# Patient Record
Sex: MALE | Race: WHITE | NOT HISPANIC OR LATINO | URBAN - METROPOLITAN AREA
[De-identification: names, ages, dates, MRNs, and addresses within clinical notes are randomized per-mention and may not be internally consistent; named-entity substitution may affect disease eponyms.]

---

## 2017-01-03 ENCOUNTER — COMMUNICATION - HEALTHEAST (OUTPATIENT)
Dept: FAMILY MEDICINE | Facility: CLINIC | Age: 37
End: 2017-01-03

## 2017-01-03 DIAGNOSIS — F90.0 ADHD, PREDOMINANTLY INATTENTIVE TYPE: ICD-10-CM

## 2017-01-03 DIAGNOSIS — G89.4 CHRONIC PAIN SYNDROME: ICD-10-CM

## 2017-01-04 ENCOUNTER — COMMUNICATION - HEALTHEAST (OUTPATIENT)
Dept: FAMILY MEDICINE | Facility: CLINIC | Age: 37
End: 2017-01-04

## 2017-01-12 ENCOUNTER — OFFICE VISIT - HEALTHEAST (OUTPATIENT)
Dept: FAMILY MEDICINE | Facility: CLINIC | Age: 37
End: 2017-01-12

## 2017-01-12 DIAGNOSIS — F45.8 BRUXISM: ICD-10-CM

## 2017-01-12 DIAGNOSIS — Z30.09 ENCOUNTER FOR VASECTOMY COUNSELING: ICD-10-CM

## 2017-01-12 DIAGNOSIS — K14.0 GLOSSITIS: ICD-10-CM

## 2017-01-31 ENCOUNTER — COMMUNICATION - HEALTHEAST (OUTPATIENT)
Dept: FAMILY MEDICINE | Facility: CLINIC | Age: 37
End: 2017-01-31

## 2017-01-31 DIAGNOSIS — G89.4 CHRONIC PAIN SYNDROME: ICD-10-CM

## 2017-01-31 DIAGNOSIS — F90.0 ADHD, PREDOMINANTLY INATTENTIVE TYPE: ICD-10-CM

## 2017-01-31 DIAGNOSIS — F41.9 ANXIETY: ICD-10-CM

## 2017-02-01 ENCOUNTER — COMMUNICATION - HEALTHEAST (OUTPATIENT)
Dept: FAMILY MEDICINE | Facility: CLINIC | Age: 37
End: 2017-02-01

## 2017-02-15 ENCOUNTER — COMMUNICATION - HEALTHEAST (OUTPATIENT)
Dept: FAMILY MEDICINE | Facility: CLINIC | Age: 37
End: 2017-02-15

## 2017-03-02 ENCOUNTER — COMMUNICATION - HEALTHEAST (OUTPATIENT)
Dept: FAMILY MEDICINE | Facility: CLINIC | Age: 37
End: 2017-03-02

## 2017-03-02 DIAGNOSIS — F90.0 ADHD, PREDOMINANTLY INATTENTIVE TYPE: ICD-10-CM

## 2017-03-02 DIAGNOSIS — G89.4 CHRONIC PAIN SYNDROME: ICD-10-CM

## 2017-04-04 ENCOUNTER — COMMUNICATION - HEALTHEAST (OUTPATIENT)
Dept: FAMILY MEDICINE | Facility: CLINIC | Age: 37
End: 2017-04-04

## 2017-04-04 DIAGNOSIS — F90.0 ADHD, PREDOMINANTLY INATTENTIVE TYPE: ICD-10-CM

## 2017-04-04 DIAGNOSIS — G89.4 CHRONIC PAIN SYNDROME: ICD-10-CM

## 2017-05-01 ENCOUNTER — COMMUNICATION - HEALTHEAST (OUTPATIENT)
Dept: FAMILY MEDICINE | Facility: CLINIC | Age: 37
End: 2017-05-01

## 2017-05-01 DIAGNOSIS — F90.0 ADHD, PREDOMINANTLY INATTENTIVE TYPE: ICD-10-CM

## 2017-05-01 DIAGNOSIS — F41.9 ANXIETY: ICD-10-CM

## 2017-05-01 DIAGNOSIS — F33.1 MODERATE EPISODE OF RECURRENT MAJOR DEPRESSIVE DISORDER (H): ICD-10-CM

## 2017-05-01 DIAGNOSIS — G89.4 CHRONIC PAIN SYNDROME: ICD-10-CM

## 2017-05-28 ENCOUNTER — COMMUNICATION - HEALTHEAST (OUTPATIENT)
Dept: FAMILY MEDICINE | Facility: CLINIC | Age: 37
End: 2017-05-28

## 2017-05-28 DIAGNOSIS — G89.4 CHRONIC PAIN SYNDROME: ICD-10-CM

## 2017-05-28 DIAGNOSIS — F90.0 ADHD, PREDOMINANTLY INATTENTIVE TYPE: ICD-10-CM

## 2017-06-05 ENCOUNTER — AMBULATORY - HEALTHEAST (OUTPATIENT)
Dept: FAMILY MEDICINE | Facility: CLINIC | Age: 37
End: 2017-06-05

## 2017-06-20 ENCOUNTER — COMMUNICATION - HEALTHEAST (OUTPATIENT)
Dept: FAMILY MEDICINE | Facility: CLINIC | Age: 37
End: 2017-06-20

## 2017-06-20 DIAGNOSIS — G89.4 CHRONIC PAIN SYNDROME: ICD-10-CM

## 2017-06-20 DIAGNOSIS — F90.0 ADHD, PREDOMINANTLY INATTENTIVE TYPE: ICD-10-CM

## 2017-07-26 ENCOUNTER — COMMUNICATION - HEALTHEAST (OUTPATIENT)
Dept: FAMILY MEDICINE | Facility: CLINIC | Age: 37
End: 2017-07-26

## 2017-07-26 DIAGNOSIS — G89.4 CHRONIC PAIN SYNDROME: ICD-10-CM

## 2017-07-26 DIAGNOSIS — F90.0 ADHD, PREDOMINANTLY INATTENTIVE TYPE: ICD-10-CM

## 2017-08-22 ENCOUNTER — COMMUNICATION - HEALTHEAST (OUTPATIENT)
Dept: SCHEDULING | Facility: CLINIC | Age: 37
End: 2017-08-22

## 2017-08-22 DIAGNOSIS — F41.9 ANXIETY: ICD-10-CM

## 2017-08-22 DIAGNOSIS — F90.0 ADHD, PREDOMINANTLY INATTENTIVE TYPE: ICD-10-CM

## 2017-08-22 DIAGNOSIS — G89.4 CHRONIC PAIN SYNDROME: ICD-10-CM

## 2017-09-12 ENCOUNTER — OFFICE VISIT - HEALTHEAST (OUTPATIENT)
Dept: FAMILY MEDICINE | Facility: CLINIC | Age: 37
End: 2017-09-12

## 2017-09-12 ENCOUNTER — COMMUNICATION - HEALTHEAST (OUTPATIENT)
Dept: FAMILY MEDICINE | Facility: CLINIC | Age: 37
End: 2017-09-12

## 2017-09-12 DIAGNOSIS — E55.9 VITAMIN D DEFICIENCY: ICD-10-CM

## 2017-09-12 DIAGNOSIS — M54.5 CHRONIC MIDLINE LOW BACK PAIN, WITH SCIATICA PRESENCE UNSPECIFIED: ICD-10-CM

## 2017-09-12 DIAGNOSIS — E66.3 OVERWEIGHT (BMI 25.0-29.9): ICD-10-CM

## 2017-09-12 DIAGNOSIS — F90.0 ADHD, PREDOMINANTLY INATTENTIVE TYPE: ICD-10-CM

## 2017-09-12 DIAGNOSIS — G89.29 CHRONIC MIDLINE LOW BACK PAIN, WITH SCIATICA PRESENCE UNSPECIFIED: ICD-10-CM

## 2017-09-12 DIAGNOSIS — E78.00 PURE HYPERCHOLESTEROLEMIA: ICD-10-CM

## 2017-09-12 DIAGNOSIS — Z00.00 ROUTINE GENERAL MEDICAL EXAMINATION AT A HEALTH CARE FACILITY: ICD-10-CM

## 2017-09-12 DIAGNOSIS — R79.89 ABNORMAL LFTS: ICD-10-CM

## 2017-09-12 DIAGNOSIS — R07.81 PLEURITIC CHEST PAIN: ICD-10-CM

## 2017-09-12 DIAGNOSIS — F41.1 ANXIETY STATE: ICD-10-CM

## 2017-09-12 LAB
CHOLEST SERPL-MCNC: 193 MG/DL
FASTING STATUS PATIENT QL REPORTED: YES
HDLC SERPL-MCNC: 46 MG/DL
LDLC SERPL CALC-MCNC: 117 MG/DL
TRIGL SERPL-MCNC: 148 MG/DL

## 2017-09-12 ASSESSMENT — MIFFLIN-ST. JEOR: SCORE: 1776.28

## 2017-09-13 ENCOUNTER — COMMUNICATION - HEALTHEAST (OUTPATIENT)
Dept: SCHEDULING | Facility: CLINIC | Age: 37
End: 2017-09-13

## 2017-09-20 ENCOUNTER — COMMUNICATION - HEALTHEAST (OUTPATIENT)
Dept: SCHEDULING | Facility: CLINIC | Age: 37
End: 2017-09-20

## 2017-09-20 DIAGNOSIS — G89.4 CHRONIC PAIN SYNDROME: ICD-10-CM

## 2017-09-21 ENCOUNTER — COMMUNICATION - HEALTHEAST (OUTPATIENT)
Dept: FAMILY MEDICINE | Facility: CLINIC | Age: 37
End: 2017-09-21

## 2017-09-21 DIAGNOSIS — G89.4 CHRONIC PAIN SYNDROME: ICD-10-CM

## 2017-09-21 DIAGNOSIS — F90.0 ADHD, PREDOMINANTLY INATTENTIVE TYPE: ICD-10-CM

## 2017-09-22 ENCOUNTER — COMMUNICATION - HEALTHEAST (OUTPATIENT)
Dept: FAMILY MEDICINE | Facility: CLINIC | Age: 37
End: 2017-09-22

## 2017-10-09 ENCOUNTER — AMBULATORY - HEALTHEAST (OUTPATIENT)
Dept: NURSING | Facility: CLINIC | Age: 37
End: 2017-10-09

## 2017-10-09 DIAGNOSIS — Z23 NEED FOR VACCINATION: ICD-10-CM

## 2017-10-10 ENCOUNTER — COMMUNICATION - HEALTHEAST (OUTPATIENT)
Dept: FAMILY MEDICINE | Facility: CLINIC | Age: 37
End: 2017-10-10

## 2017-10-10 DIAGNOSIS — F90.0 ADHD, PREDOMINANTLY INATTENTIVE TYPE: ICD-10-CM

## 2017-10-16 ENCOUNTER — OFFICE VISIT - HEALTHEAST (OUTPATIENT)
Dept: FAMILY MEDICINE | Facility: CLINIC | Age: 37
End: 2017-10-16

## 2017-10-16 DIAGNOSIS — L01.00 IMPETIGO: ICD-10-CM

## 2017-11-02 ENCOUNTER — COMMUNICATION - HEALTHEAST (OUTPATIENT)
Dept: SCHEDULING | Facility: CLINIC | Age: 37
End: 2017-11-02

## 2017-11-02 ENCOUNTER — COMMUNICATION - HEALTHEAST (OUTPATIENT)
Dept: FAMILY MEDICINE | Facility: CLINIC | Age: 37
End: 2017-11-02

## 2017-11-02 DIAGNOSIS — F90.0 ADHD, PREDOMINANTLY INATTENTIVE TYPE: ICD-10-CM

## 2017-11-02 DIAGNOSIS — G89.4 CHRONIC PAIN SYNDROME: ICD-10-CM

## 2017-11-02 DIAGNOSIS — F41.9 ANXIETY: ICD-10-CM

## 2017-11-02 DIAGNOSIS — F33.1 MODERATE EPISODE OF RECURRENT MAJOR DEPRESSIVE DISORDER (H): ICD-10-CM

## 2017-11-27 ENCOUNTER — COMMUNICATION - HEALTHEAST (OUTPATIENT)
Dept: FAMILY MEDICINE | Facility: CLINIC | Age: 37
End: 2017-11-27

## 2017-11-28 ENCOUNTER — COMMUNICATION - HEALTHEAST (OUTPATIENT)
Dept: FAMILY MEDICINE | Facility: CLINIC | Age: 37
End: 2017-11-28

## 2017-11-28 DIAGNOSIS — R06.83 SNORING: ICD-10-CM

## 2017-11-29 ENCOUNTER — COMMUNICATION - HEALTHEAST (OUTPATIENT)
Dept: FAMILY MEDICINE | Facility: CLINIC | Age: 37
End: 2017-11-29

## 2017-11-29 DIAGNOSIS — F90.0 ADHD, PREDOMINANTLY INATTENTIVE TYPE: ICD-10-CM

## 2017-12-26 ENCOUNTER — COMMUNICATION - HEALTHEAST (OUTPATIENT)
Dept: FAMILY MEDICINE | Facility: CLINIC | Age: 37
End: 2017-12-26

## 2017-12-26 DIAGNOSIS — F90.0 ADHD, PREDOMINANTLY INATTENTIVE TYPE: ICD-10-CM

## 2017-12-26 DIAGNOSIS — F41.9 ANXIETY: ICD-10-CM

## 2017-12-26 DIAGNOSIS — G89.4 CHRONIC PAIN SYNDROME: ICD-10-CM

## 2017-12-26 DIAGNOSIS — F33.1 MODERATE EPISODE OF RECURRENT MAJOR DEPRESSIVE DISORDER (H): ICD-10-CM

## 2018-01-10 ENCOUNTER — COMMUNICATION - HEALTHEAST (OUTPATIENT)
Dept: FAMILY MEDICINE | Facility: CLINIC | Age: 38
End: 2018-01-10

## 2018-01-10 DIAGNOSIS — F41.9 ANXIETY: ICD-10-CM

## 2018-01-17 ENCOUNTER — COMMUNICATION - HEALTHEAST (OUTPATIENT)
Dept: FAMILY MEDICINE | Facility: CLINIC | Age: 38
End: 2018-01-17

## 2018-01-17 DIAGNOSIS — F33.1 MODERATE EPISODE OF RECURRENT MAJOR DEPRESSIVE DISORDER (H): ICD-10-CM

## 2018-01-17 DIAGNOSIS — F41.9 ANXIETY: ICD-10-CM

## 2018-01-17 DIAGNOSIS — F90.0 ADHD, PREDOMINANTLY INATTENTIVE TYPE: ICD-10-CM

## 2018-02-08 ENCOUNTER — COMMUNICATION - HEALTHEAST (OUTPATIENT)
Dept: FAMILY MEDICINE | Facility: CLINIC | Age: 38
End: 2018-02-08

## 2018-02-08 DIAGNOSIS — G89.4 CHRONIC PAIN SYNDROME: ICD-10-CM

## 2018-02-14 ENCOUNTER — COMMUNICATION - HEALTHEAST (OUTPATIENT)
Dept: FAMILY MEDICINE | Facility: CLINIC | Age: 38
End: 2018-02-14

## 2018-02-14 DIAGNOSIS — F90.0 ADHD, PREDOMINANTLY INATTENTIVE TYPE: ICD-10-CM

## 2018-03-02 ENCOUNTER — COMMUNICATION - HEALTHEAST (OUTPATIENT)
Dept: FAMILY MEDICINE | Facility: CLINIC | Age: 38
End: 2018-03-02

## 2018-03-02 DIAGNOSIS — F41.9 ANXIETY: ICD-10-CM

## 2018-03-02 DIAGNOSIS — F33.1 MODERATE EPISODE OF RECURRENT MAJOR DEPRESSIVE DISORDER (H): ICD-10-CM

## 2018-03-02 RX ORDER — ESCITALOPRAM OXALATE 20 MG/1
TABLET ORAL
Qty: 90 TABLET | Refills: 1 | Status: SHIPPED | OUTPATIENT
Start: 2018-03-02

## 2018-03-07 ENCOUNTER — COMMUNICATION - HEALTHEAST (OUTPATIENT)
Dept: FAMILY MEDICINE | Facility: CLINIC | Age: 38
End: 2018-03-07

## 2018-03-07 DIAGNOSIS — F41.9 ANXIETY: ICD-10-CM

## 2018-03-07 DIAGNOSIS — G89.4 CHRONIC PAIN SYNDROME: ICD-10-CM

## 2018-03-13 ENCOUNTER — COMMUNICATION - HEALTHEAST (OUTPATIENT)
Dept: SCHEDULING | Facility: CLINIC | Age: 38
End: 2018-03-13

## 2018-03-13 DIAGNOSIS — F90.0 ADHD, PREDOMINANTLY INATTENTIVE TYPE: ICD-10-CM

## 2018-04-16 ENCOUNTER — COMMUNICATION - HEALTHEAST (OUTPATIENT)
Dept: FAMILY MEDICINE | Facility: CLINIC | Age: 38
End: 2018-04-16

## 2018-04-16 DIAGNOSIS — F41.9 ANXIETY: ICD-10-CM

## 2018-04-16 DIAGNOSIS — F90.0 ADHD, PREDOMINANTLY INATTENTIVE TYPE: ICD-10-CM

## 2018-04-17 RX ORDER — CLONAZEPAM 1 MG/1
1 TABLET ORAL 3 TIMES DAILY PRN
Qty: 90 TABLET | Refills: 0 | Status: SHIPPED | OUTPATIENT
Start: 2018-04-17

## 2018-05-08 ENCOUNTER — OFFICE VISIT - HEALTHEAST (OUTPATIENT)
Dept: FAMILY MEDICINE | Facility: CLINIC | Age: 38
End: 2018-05-08

## 2018-05-08 DIAGNOSIS — M54.5 CHRONIC MIDLINE LOW BACK PAIN, WITH SCIATICA PRESENCE UNSPECIFIED: ICD-10-CM

## 2018-05-08 DIAGNOSIS — F90.0 ADHD, PREDOMINANTLY INATTENTIVE TYPE: ICD-10-CM

## 2018-05-08 DIAGNOSIS — E66.3 OVERWEIGHT (BMI 25.0-29.9): ICD-10-CM

## 2018-05-08 DIAGNOSIS — Z00.00 ROUTINE GENERAL MEDICAL EXAMINATION AT A HEALTH CARE FACILITY: ICD-10-CM

## 2018-05-08 DIAGNOSIS — R79.89 ABNORMAL LFTS: ICD-10-CM

## 2018-05-08 DIAGNOSIS — F41.9 ANXIETY: ICD-10-CM

## 2018-05-08 DIAGNOSIS — E78.00 PURE HYPERCHOLESTEROLEMIA: ICD-10-CM

## 2018-05-08 DIAGNOSIS — G89.29 CHRONIC MIDLINE LOW BACK PAIN, WITH SCIATICA PRESENCE UNSPECIFIED: ICD-10-CM

## 2018-05-08 DIAGNOSIS — E55.9 VITAMIN D DEFICIENCY: ICD-10-CM

## 2018-05-08 ASSESSMENT — MIFFLIN-ST. JEOR: SCORE: 1777.42

## 2018-05-17 ENCOUNTER — COMMUNICATION - HEALTHEAST (OUTPATIENT)
Dept: FAMILY MEDICINE | Facility: CLINIC | Age: 38
End: 2018-05-17

## 2018-05-17 DIAGNOSIS — G89.4 CHRONIC PAIN SYNDROME: ICD-10-CM

## 2018-06-05 ENCOUNTER — COMMUNICATION - HEALTHEAST (OUTPATIENT)
Dept: FAMILY MEDICINE | Facility: CLINIC | Age: 38
End: 2018-06-05

## 2018-06-05 DIAGNOSIS — F90.0 ADHD, PREDOMINANTLY INATTENTIVE TYPE: ICD-10-CM

## 2018-06-15 ENCOUNTER — COMMUNICATION - HEALTHEAST (OUTPATIENT)
Dept: FAMILY MEDICINE | Facility: CLINIC | Age: 38
End: 2018-06-15

## 2018-06-15 DIAGNOSIS — G89.4 CHRONIC PAIN SYNDROME: ICD-10-CM

## 2018-07-06 ENCOUNTER — COMMUNICATION - HEALTHEAST (OUTPATIENT)
Dept: FAMILY MEDICINE | Facility: CLINIC | Age: 38
End: 2018-07-06

## 2018-07-06 DIAGNOSIS — F90.0 ADHD, PREDOMINANTLY INATTENTIVE TYPE: ICD-10-CM

## 2018-08-02 ENCOUNTER — COMMUNICATION - HEALTHEAST (OUTPATIENT)
Dept: FAMILY MEDICINE | Facility: CLINIC | Age: 38
End: 2018-08-02

## 2018-08-02 DIAGNOSIS — F90.0 ADHD, PREDOMINANTLY INATTENTIVE TYPE: ICD-10-CM

## 2018-08-03 RX ORDER — DEXTROAMPHETAMINE SACCHARATE, AMPHETAMINE ASPARTATE, DEXTROAMPHETAMINE SULFATE AND AMPHETAMINE SULFATE 5; 5; 5; 5 MG/1; MG/1; MG/1; MG/1
20 TABLET ORAL 2 TIMES DAILY WITH MEALS
Qty: 60 TABLET | Refills: 0 | Status: SHIPPED | OUTPATIENT
Start: 2018-08-03

## 2018-08-08 ENCOUNTER — COMMUNICATION - HEALTHEAST (OUTPATIENT)
Dept: FAMILY MEDICINE | Facility: CLINIC | Age: 38
End: 2018-08-08

## 2018-08-08 DIAGNOSIS — G89.4 CHRONIC PAIN SYNDROME: ICD-10-CM

## 2018-08-08 RX ORDER — TRAMADOL HYDROCHLORIDE 50 MG/1
TABLET ORAL
Qty: 60 TABLET | Refills: 1 | Status: SHIPPED | OUTPATIENT
Start: 2018-08-08

## 2021-05-30 VITALS — BODY MASS INDEX: 26.93 KG/M2 | WEIGHT: 189 LBS

## 2021-05-31 VITALS — BODY MASS INDEX: 25.9 KG/M2 | WEIGHT: 185 LBS | HEIGHT: 71 IN

## 2021-05-31 VITALS — WEIGHT: 184 LBS | BODY MASS INDEX: 25.66 KG/M2

## 2021-06-01 VITALS — WEIGHT: 187 LBS | HEIGHT: 71 IN | BODY MASS INDEX: 26.18 KG/M2

## 2021-06-03 ENCOUNTER — RECORDS - HEALTHEAST (OUTPATIENT)
Dept: ADMINISTRATIVE | Facility: CLINIC | Age: 41
End: 2021-06-03

## 2021-06-08 NOTE — PROGRESS NOTES
Subjective:    Johnathan Grace is seen today for several concerns.  Vasectomy consult.  Desires permanent sterilization.  Remarried.  Wife is due February 2, 2017 expecting baby boy Nikolai.  Patient denies prior concerns for inguinal hernia, bleeding disorders, family history of bleeding disorder, etc.  No history of erectile dysfunction etc.  Has had concerns with bruxism.  Was given mouthguard by his dentist.  Tongue seems to bother him more now on the sides.  This may preceded mouthguard however.  Has had deep fissuring of the tongue in the past.  Does brush his tongue on a consistent basis apparently.  No other recent illness.  No postnasal drainage.     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 Sudha Hernandez [phone]    Past Surgical History   Procedure Laterality Date     Wrist surgery          Family History   Problem Relation Age of Onset     Anxiety disorder Father         Past Medical History   Diagnosis Date     Anxiety         Social History   Substance Use Topics     Smoking status: Never Smoker     Smokeless tobacco: None     Alcohol use No        Current Outpatient Prescriptions   Medication Sig Dispense Refill     chlorhexidine (PERIDEX) 0.12 % solution        clonazePAM (KLONOPIN) 1 MG tablet Take 0.5-1 tablets (0.5-1 mg total) by mouth 3 (three) times a day as needed for anxiety. 60  tablet 0     cyclobenzaprine (FLEXERIL) 10 MG tablet Take 1 tablet at QHS for muscle spasm. 30 tablet 1     dextroamphetamine-amphetamine (ADDERALL) 20 mg Tab Take 20 mg by mouth 2 times daily before breakfast and lunch. 60 tablet 0     escitalopram oxalate (LEXAPRO) 20 MG tablet 1 tablet (20 mg) by mouth daily 90 tablet 1     naproxen (NAPROSYN) 500 MG tablet Take 1 tablet (500 mg total) by mouth 2 (two) times a day with meals. 30 tablet 0     traMADol (ULTRAM) 50 mg tablet Take 1-2 tablets ( mg total) by mouth every 4 (four) hours as needed for pain. 60 tablet 0     alum/mag hydrox-simethicone-diphenhydramine-lidocaine (MAGIC MOUTHWASH) suspension Swish and spit 15 mL 4 (four) times a day. 240 mL 0     gabapentin (NEURONTIN) 300 MG capsule Take 1 capsule (300 mg total) by mouth daily. Increase by 1 tab every 3 days.  Max dose 900 mg tid 180 capsule 2     No current facility-administered medications for this visit.           Objective:    Vitals:    01/12/17 1207   BP: 110/78   Pulse: 100   Weight: 189 lb (85.7 kg)      Body mass index is 26.93 kg/(m^2).    Alert.  No apparent distress.  HEENT exam with slight conjunctival injection.  Oropharynx without evidence of obvious thrush.  Fissuring of tongue noted and significant papillae.  No buccal mucosal irritation identified.  No tongue ulceration.  Neck supple without cervical lymphadenopathy.  Genitalia circumcised male testes descended bilaterally with palpable vas deferens bilaterally.  No inguinal hernia.  No hydrocele or varicocele.      Assessment:    1. Encounter for vasectomy counseling     2. Glossitis  alum/mag hydrox-simethicone-diphenhydramine-lidocaine (MAGIC MOUTHWASH) suspension   3. Bruxism           Plan:    Contraception counseling reviewed.  Risks benefits alternatives discussed.  Pre-vasectomy literature was provided.  Consent form reviewed and signed by patient.  Patient was scheduled for vasectomy February 17, 2017 likely.  Did discuss  evidence for bruxism and glossitis.  Magic mouthwash 4 times daily as needed.  Ensure adequate fit of mouthguard for bruxism management.  Notify of persistent concerns or worsening.

## 2021-06-12 NOTE — PROGRESS NOTES
Assessment:     1. Routine general medical examination at a health care facility     2. Overweight (BMI 25.0-29.9)     3. Anxiety     4. ADHD, predominantly inattentive type     5. Hypercholesterolemia  Lipid Cascade   6. Abnormal LFTs  Comprehensive Metabolic Panel    GGT (Gamma GT)   7. Pleuritic chest pain     8. Vitamin D deficiency  Vitamin D, Total (25-Hydroxy)   9. Chronic midline low back pain, with sciatica presence unspecified          Plan:      Routine healthcare maintenance.  Preventative cares reviewed.  Patient will receive seasonal influenza through work otherwise immunizations up-to-date.  Check lipid cascade with history of hypercholesterolemia.  Dietary and exercise modifications as noted below for weight loss attempts.  Pleuritic chest pain described likely musculoskeletal etiology.  Will monitor notify persistent concerns or worsening.  Repeat vitamin D level today.  Patient has not been consistent with vitamin D supplement.  ADHD, stable.  Does not require refills at this time.  Anticipate ADHD recheck 6 months in office.  Discussed chronic low back pain, stable continues tramadol use on as-needed basis.  Anxiety increase slightly due to workplace issues otherwise DWAYNE 7 questionnaire 3 out of 21 and PHQ 9 questionnaire 3 out of 27.    The following high BMI interventions were performed this visit: encouragement to exercise, weight monitoring, weight loss from baseline weight and lifestyle education regarding diet.  Weight goal < 180 pounds initially, < 175 pounds ideally.           Subjective:      Johnathan Grace is a 37 y.o. male who presents for an annual exam.  In general doing well.  Some increased work stress recently.  ADHD predominantly inattentive type stable with amphetamine dextroamphetamine 20 mg a.m. and noon.  Clonazepam available on as-needed basis for anxiety.  Continues use of S-Citalopram 20 mg daily for anxiety and depression as well.  4 days of chest discomfort with deep  breath or cough, mild left anterior chest.  No hemoptysis.  No recent immobilization.  No fevers or chills.  Lower back pain, stable.  Some irritability with tramadol use described.  Comprehensive review of systems as above otherwise all negative.    1 daughter - Vi (born 9/908) age 8 (2nd grade at [employer], fall, 2016)   Remarried - Maria Antonia   No smoke (quit ~ Sept, 2011) - previously ~ 1/2-1 ppd   EtOH: occ beer only Live in [place], [place] (born in [place] 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 Sudha Hernandez [phone]    Healthy Habits:   Regular Exercise: not as much due to busy  Healthy Diet: inconsistent  Dental Visits Regularly: Yes  Seat Belt: Yes   Sexually active: Yes  Colonoscopy: N/A  Lipid Profile: Yes  Glucose Screen: Yes    Immunization History   Administered Date(s) Administered     DTaP, historic 1982, 1982, 1982, 05/22/1984, 11/11/1987     Hep A, Adult 01/13/2016, 08/26/2016     IPV 1982, 1982, 1982, 05/22/1984, 11/11/1987     Influenza [id number], 10/22/2009     Influenza, inj, historic 10/30/2006, 11/02/2007, 10/23/2008, 10/20/2009, 10/12/2010, 09/13/2013, 10/01/2014, 10/15/2015, 09/15/2016     Influenza, seasonal,quad inj 6-35 mos 09/13/2013     MMR 08/29/1983     Td, historic 08/23/2004     Tdap 11/22/2013     Typhoid, Inj, Inactive 01/11/2016     Immunization status: up to date and documented, will get flu shot at work.  Vision Screening:both eyes  Hearing: PASS     Current Outpatient Prescriptions   Medication Sig Dispense Refill      "clonazePAM (KLONOPIN) 1 MG tablet Take 0.5-1 tablets (0.5-1 mg total) by mouth 3 (three) times a day as needed for anxiety. 60 tablet 0     dextroamphetamine-amphetamine (ADDERALL) 20 mg Tab Take 20 mg by mouth 2 times daily before breakfast and lunch. 60 tablet 0     escitalopram oxalate (LEXAPRO) 20 MG tablet 1 tablet (20 mg) by mouth daily 90 tablet 1     traMADol (ULTRAM) 50 mg tablet Take 1-2 tablets ( mg total) by mouth every 4 (four) hours as needed for pain. 60 tablet 0     alum/mag hydrox-simethicone-diphenhydramine-lidocaine (MAGIC MOUTHWASH) suspension Swish and spit 15 mL 4 (four) times a day. 240 mL 0     chlorhexidine (PERIDEX) 0.12 % solution        cyclobenzaprine (FLEXERIL) 10 MG tablet Take 1 tablet at QHS for muscle spasm. 30 tablet 1     naproxen (NAPROSYN) 500 MG tablet Take 1 tablet (500 mg total) by mouth 2 (two) times a day with meals. 30 tablet 0     No current facility-administered medications for this visit.      Past Medical History:   Diagnosis Date     Anxiety      Past Surgical History:   Procedure Laterality Date     WRIST SURGERY       Vicodin [hydrocodone-acetaminophen]  Family History   Problem Relation Age of Onset     Anxiety disorder Father      Social History     Social History     Marital status: Single     Spouse name: N/A     Number of children: N/A     Years of education: N/A     Occupational History     Not on file.     Social History Main Topics     Smoking status: Never Smoker     Smokeless tobacco: Not on file     Alcohol use No     Drug use: No     Sexual activity: Not on file     Other Topics Concern     Not on file     Social History Narrative       Review of Systems  Comprehensive ROS: as above, otherwise all negative.           Objective:     /68  Pulse 64  Ht 5' 11\" (1.803 m)  Wt 185 lb (83.9 kg)  BMI 25.8 kg/m2  Body mass index is 25.8 kg/(m^2).    Physical    General Appearance:    Alert, cooperative, no distress, appears stated age.  Overweight. "   Head:    Normocephalic, without obvious abnormality, atraumatic   Eyes:    PERRL, conjunctiva/corneas clear, EOM's intact, fundi     benign, both eyes        Ears:    Normal TM's and external ear canals, both ears   Nose:   Nares normal, septum midline, mucosa normal, no drainage    or sinus tenderness   Throat:   Lips, mucosa, and tongue normal; teeth and gums normal   Neck:   Supple, symmetrical, trachea midline, no adenopathy;        thyroid:  No enlargement/tenderness/nodules; no carotid    bruit or JVD   Back:     Symmetric, no curvature, ROM normal, no CVA tenderness   Lungs:     Clear to auscultation bilaterally, respirations unlabored   Chest wall:    No tenderness or deformity   Heart:    Regular rate and rhythm, S1 and S2 normal, no murmur, rub   or gallop   Abdomen:     Soft, non-tender, bowel sounds active all four quadrants,     no masses, no organomegaly.     Genitalia:    Normal male without lesion, discharge or tenderness.  No inguinal hernia noted.     Rectal:    deferred   Extremities:   Extremities normal, atraumatic, no cyanosis or edema   Pulses:   2+ and symmetric all extremities   Skin:   Skin color, texture, turgor normal, no rashes or lesions   Lymph nodes:   Cervical, supraclavicular, and axillary nodes normal   Neurologic:   CNII-XII intact. Normal strength, sensation and reflexes       throughout

## 2021-06-13 NOTE — PROGRESS NOTES
"Assessment/Plan:    Johnathan was seen today for mass.    Diagnoses and all orders for this visit:    Impetigo: The gold crusted appearance the patient is describing is consistent with impetigo.  He does have coverage for bolus normal organisms as well as drug resistant organisms in his to medication regimen.  There is no indication to change his medications.  I did tell the patient that if things dramatically improve over the next couple days he might benefit from stepdown to Bactroban.  He will follow-up as needed.  He asked about infectivity.  Given that he had crusted discharge lesions consistent with impetigo, I do believe he is still infectious at this time.  He will wash his hands over the course of his day.    Larry Villa MD  _______________________________    Chief Complaint   Patient presents with     Mass     upper lip      Subjective: Johnathan Grace is a 37 y.o. year old male who I have seen in clinic before who presents with the following acute complaint(s):    Skin infection:   - swollen upper lip.     - skin has been discolored.     - he was able to express pus   - was seen at an urgent care 2 days ago.  \"Strong antibiotic.\"   - it is getting better.    - on bactrim and keflex.  I reviewed the urgent care note via care everywhere.  -No fevers.  -Otherwise feels well.    ROS: Complete review of systems obtained.  Pertinent items are listed above.     The following portions of the patient's history were reviewed and updated as appropriate: allergies, current medications, past medical history and problem list.     Objective:   /76 (Patient Site: Left Arm, Patient Position: Sitting, Cuff Size: Adult Regular)  Pulse 100  Temp 98.7  F (37.1  C) (Oral)   Wt 184 lb (83.5 kg)  BMI 25.66 kg/m2  General: No acute distress  Skin: There is an area that appears swollen on the upper lip, specifically approximately 1 cm left of the philtrum.  No discharge.  Mildly erythematous.    No results found for this or " any previous visit (from the past 24 hour(s)).  No results found.    Additional History from Old Records Summarized (2): yes  Decision to Obtain Records (1): no  Radiology Tests Summarized or Ordered (1): no  Labs Reviewed or Ordered (1): no  Medicine Test Summarized or Ordered (1): no  Independent Review of EKG or X-RAY(2 each): no    This note has been dictated using voice recognition software. Any grammatical or context distortions are unintentional and inherent to the software

## 2021-06-17 NOTE — PROGRESS NOTES
Assessment/Plan:     1. Routine general medical examination at a health care facility  Routine healthcare maintenance.  Preventative cares reviewed.  Immunizations reviewed and up-to-date.  Annual physical exams to continue.    2. Overweight (BMI 25.0-29.9)  Dietary and exercise modifications for weight goal less than 180 pounds initially, less than 175 pounds ideally.    3. Hypercholesterolemia  Patient will check lipid cascade per patient request in next 3 months.  - Lipid Cascade; Future    4. ADHD, predominantly inattentive type  ADHD predominantly inattentive type.  Refill on amphetamine dextroamphetamine 20 mg twice daily with refill provided.  Reassess at follow-up controlled substance visit 6 months per  - dextroamphetamine-amphetamine (ADDERALL) 20 mg Tab; Take 20 mg by mouth 2 times daily before breakfast and lunch.  Dispense: 60 tablet; Refill: 0    5. Anxiety  DWAYNE 7 questionnaire 0 out of 21 and PHQ 9 questionnaire 1 out of 27.  Continues escitalopram 20 mg daily.  Has clonazepam 1 mg up to 3 times daily as needed available but not using a consistent basis.    6. Abnormal LFTs  Has had LFT elevation historically.  Improvement noted in AST and ALT levels.  Mild residual GGT elevation.  Will repeat comprehensive metabolic panel, GGT and CBC at follow-up lab only visit.  - HM2(CBC w/o Differential); Future  - GGT (Gamma GT); Future  - Comprehensive Metabolic Panel; Future    7. Vitamin D deficiency  We will repeat vitamin D level at follow-up lab only visit.  Otherwise continuing to tablets daily likely 2000 units each for a total of 4000 units daily per patient.  - Vitamin D, Total (25-Hydroxy); Future    8. Chronic midline low back pain, with sciatica presence unspecified  Feels like chronic low back pain stable without the lumbar radiculopathy at this time.  Anticipates upcoming backpacking trip to the [place].  May have chiropractic adjustment prior.  Has tramadol available and states the last  2 times he is taken this it did cause some rash or hives perhaps however uncertain if medication related.  Will monitor closely.           Subjective:      Johnathan Grace is a 38 y.o. male who presents for an annual exam.  In general doing well.  Chronic lower back pain is stable without radicular symptoms at this time.  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  Less anxiety.  Continues escitalopram 20 mg daily.  Uses clonazepam on as-needed basis.  Adderall 20 mg morning and lunch for ADHD predominantly inattentive type.  Improved focus and concentration.  Using vitamin D supplement up to 2 tablets daily which may equal 4000 units however patient needs to confirm.  Has had cholesterol elevation.  Recognizes weight gain over the winter and being less active.  Comprehensive review of systems as above otherwise all negative.     - her new boyfriend abuses drugs   1 daughter - Vi (born 9/908) age 8 (2nd grade at [employer], fall, 2016)   Remarried - Maria Antonia   1 son - Lior (15 months)  No smoke (quit ~ Sept, 2011) - previously ~ 1/2-1 ppd   EtOH: occ beer only   Live in [place], [place] (born in [place] but after age 6 grew up in [place])   Mom -   Dad - anxiety attacks   1 half-bro - anxiety   2 half-sisters   Pat grandfather - Prostate cancer age 62, colon CA late 60s or early 70s  Pat. Uncle - Prostate cancer age ?  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 Sudha Hernandez [phone]    Healthy Habits:   Regular Exercise: Yes  Healthy Diet: Yes  Dental Visits Regularly: Yes  Seat Belt: Yes   Sexually active: Yes  Colonoscopy: N/A  Lipid Profile: Yes  Glucose Screen: Yes    Immunization History   Administered Date(s) Administered      DTaP, historic 1982, 1982, 1982, 05/22/1984, 11/11/1987     Hep A, Adult IM (19yr & older) 01/13/2016, 08/26/2016     IPV 1982, 1982, 1982, 05/22/1984, 11/11/1987     Influenza [id number], 10/22/2009     Influenza, inj, historic,unspecified 10/30/2006, 11/02/2007, 10/23/2008, 10/20/2009, 10/12/2010, 09/13/2013, 10/01/2014, 10/15/2015, 09/15/2016     Influenza, seasonal,quad inj 6-35 mos 09/13/2013     Influenza,seasonal quad, PF, 36+MOS 10/09/2017     MMR 08/29/1983     Td,adult,historic,unspecified 08/23/2004     Tdap 11/22/2013     Typhoid, Inj, Inactive 01/11/2016     Immunization status: up to date and documented.  Vision Screening:both eyes  Hearing: PASS     Current Outpatient Prescriptions   Medication Sig Dispense Refill     clonazePAM (KLONOPIN) 1 MG tablet Take 1 tablet (1 mg total) by mouth 3 (three) times a day as needed for anxiety. 90 tablet 0     dextroamphetamine-amphetamine (ADDERALL) 20 mg Tab Take 20 mg by mouth 2 times daily before breakfast and lunch. 60 tablet 0     escitalopram oxalate (LEXAPRO) 20 MG tablet TAKE ONE TABLET BY MOUTH ONCE DAILY 90 tablet 1     traMADol (ULTRAM) 50 mg tablet Take 1-2 tablets ( mg total) by mouth every 6 (six) hours as needed for pain. 60 tablet 1     No current facility-administered medications for this visit.      Past Medical History:   Diagnosis Date     Anxiety      Past Surgical History:   Procedure Laterality Date     WRIST SURGERY       Vicodin [hydrocodone-acetaminophen]  Family History   Problem Relation Age of Onset     Anxiety disorder Father      Prostate cancer Paternal Grandfather 62     Prostate cancer Paternal Uncle      Social History     Social History     Marital status: Single     Spouse name: N/A     Number of children: N/A     Years of education: N/A     Occupational History     Not on file.     Social History Main Topics     Smoking status: Never Smoker     Smokeless tobacco: Never Used     Alcohol  "use No     Drug use: No     Sexual activity: Not on file     Other Topics Concern     Not on file     Social History Narrative       Review of Systems  Comprehensive ROS: as above, otherwise all negative.           Objective:     /80  Pulse 80  Ht 5' 10.5\" (1.791 m)  Wt 187 lb (84.8 kg)  BMI 26.45 kg/m2  Body mass index is 26.45 kg/(m^2).    Physical    General Appearance:    Alert, cooperative, no distress, appears stated age.  Overweight.   Head:    Normocephalic, without obvious abnormality, atraumatic   Eyes:    PERRL, conjunctiva/corneas clear, EOM's intact, fundi     benign, both eyes        Ears:    Normal TM's and external ear canals, both ears   Nose:   Nares normal, septum midline, mucosa normal, no drainage    or sinus tenderness   Throat:   Lips, mucosa, and tongue normal; teeth and gums normal   Neck:   Supple, symmetrical, trachea midline, no adenopathy;        thyroid:  No enlargement/tenderness/nodules; no carotid    bruit or JVD   Back:     Symmetric, no curvature, ROM normal, no CVA tenderness   Lungs:     Clear to auscultation bilaterally, respirations unlabored   Chest wall:    No tenderness or deformity   Heart:    Regular rate and rhythm, S1 and S2 normal, no murmur, rub   or gallop   Abdomen:     Soft, non-tender, bowel sounds active all four quadrants,     no masses, no organomegaly.     Genitalia:    Normal male without lesion, discharge or tenderness.  No inguinal hernia noted.     Rectal:    deferred   Extremities:   Extremities normal, atraumatic, no cyanosis or edema   Pulses:   2+ and symmetric all extremities   Skin:   Skin color, texture, turgor normal, no rashes or lesions   Lymph nodes:   Cervical, supraclavicular, and axillary nodes normal   Neurologic:   CNII-XII intact. Normal strength, sensation and reflexes       throughout                This note has been dictated using voice recognition software and as a result may contain minor grammatical errors and unintended " word substitutions.

## 2021-07-03 NOTE — ADDENDUM NOTE
Addendum Note by Larry Greene MD at 10/18/2017 11:21 AM     Author: Larry Greene MD Service: -- Author Type: Physician    Filed: 10/18/2017 11:21 AM Encounter Date: 10/16/2017 Status: Signed    : Larry Greene MD (Physician)    Addended by: LARRY GREENE on: 10/18/2017 11:21 AM        Modules accepted: Orders

## 2022-11-22 ENCOUNTER — CONTACT MOVED (OUTPATIENT)
Age: 42
End: 2022-11-22

## 2023-02-05 ENCOUNTER — HEALTH MAINTENANCE LETTER (OUTPATIENT)
Age: 43
End: 2023-02-05

## 2024-03-03 ENCOUNTER — HEALTH MAINTENANCE LETTER (OUTPATIENT)
Age: 44
End: 2024-03-03